# Patient Record
(demographics unavailable — no encounter records)

---

## 2025-07-01 NOTE — DISCUSSION/SUMMARY
[FreeTextEntry1] : 30 yo with worsening right sided lower pelvic pain, irregular menses. Hx chronic pelvic pain, fibromyalgia.  -Vaginitis plus -Recent pelvic ultrasound normal/inconclusive, hx of endometriosis, recommend MRI Pelvis for further evaluation, referral placed -No evidence of torsion or need for acute surgical intervention at this time -RTO 6 wks for follow-up/results -Consider PFPT  I spent 42 minutes of total time on the day of the encounter preparing for the visit, review of records, interacting with the patient, EHR documentation, and coordinating care.

## 2025-07-01 NOTE — HISTORY OF PRESENT ILLNESS
[FreeTextEntry1] : LMP:   Within last year, couple times per month   Pt underwent a robot-assisted diagnostic laparoscopy, lysis of adhesions, right ovarian cystectomies, excision of endometriosis 2020. Pathology without evidence of endometriosis.  Hx of endometriosis in past. Presents in office today for right sided pelvic pain for a year now. She states the pain travels to her back as well.   Menses every other month, q8wks, lasting 4 days, previously was heavy. Since starting oral iron, has been lighter, x last 3 months. Pelvic pain worsens around menses and after, feels like rock and inflamed, can barely move around in bed. Pain is in right lower back, radiates around to right lower abdomen.    Had upper endoscopy which was normal. Having vomiting when pain intensifies, Zofran sometimes helps.    Seen in [facility] for pain 10/2024, had CT and pelvic ultrasound which were normal.     Last pap:  NILM   Since last time I saw her has been dx with lupus and fibromyalgia.  PObHx: G 9 P1 SAB x 7 (2 requiring D&C), ETOP x 1 (D&C), C/S x 1 PGynHx: hx of biopsy-diagnosed endometriosis in past, + ovarian cysts,denies fibroids, abnormal pap, or STI. s/p Gardasil vaccine.  PMH: CMT (Charcot-Charisse-Tooth disease), PVC's, lupus, fibromyalgia.  PSH: laparoscopic Right ovarian cyst removal, endometriosis clean out, , cholecystectomy, hand sx, tonsillectomy All: Shellfish, Augmentin (blisters/hives) Meds: Diazepam 5mg qam, 7.5mg qpm, baclofen 20mg qhs, meloxicam 15mg qam, hydroxychloroquine 200 mg BID, pregabalin 225mg BID (started 8 mo ago for nerve pain/fibromyalgia), Vit D, Zofran, Zanax prn, Fe, B12 FamHx: CMT (Charcot-Charisse-Tooth disease) : Father Social Hx: No tob/ETOH/drug

## 2025-07-01 NOTE — PHYSICAL EXAM
[MA] : MA [FreeTextEntry2] : Soni Davison  [Appropriately responsive] : appropriately responsive [Alert] : alert [No Acute Distress] : no acute distress [No Lymphadenopathy] : no lymphadenopathy [Soft] : soft [Non-distended] : non-distended [No HSM] : No HSM [No Lesions] : no lesions [No Mass] : no mass [Oriented x3] : oriented x3 [FreeTextEntry7] : mildly tender to deep palpation RLQ, no rebound/guarding [Labia Majora] : normal [Labia Minora] : normal [Normal] : normal [Tenderness] : nontender [Enlarged ___ wks] : not enlarged [Uterine Adnexae] : normal [FreeTextEntry4] : small amount thin white discharge